# Patient Record
Sex: MALE | Race: WHITE | NOT HISPANIC OR LATINO
[De-identification: names, ages, dates, MRNs, and addresses within clinical notes are randomized per-mention and may not be internally consistent; named-entity substitution may affect disease eponyms.]

---

## 2018-07-20 PROBLEM — Z00.00 ENCOUNTER FOR PREVENTIVE HEALTH EXAMINATION: Status: ACTIVE | Noted: 2018-07-20

## 2018-07-27 ENCOUNTER — APPOINTMENT (OUTPATIENT)
Dept: PULMONOLOGY | Facility: CLINIC | Age: 78
End: 2018-07-27
Payer: MEDICARE

## 2018-07-27 VITALS
DIASTOLIC BLOOD PRESSURE: 70 MMHG | HEART RATE: 71 BPM | OXYGEN SATURATION: 95 % | TEMPERATURE: 97.8 F | SYSTOLIC BLOOD PRESSURE: 118 MMHG | BODY MASS INDEX: 17.33 KG/M2 | WEIGHT: 104 LBS | HEIGHT: 65 IN

## 2018-07-27 DIAGNOSIS — J18.9 PNEUMONIA, UNSPECIFIED ORGANISM: ICD-10-CM

## 2018-07-27 DIAGNOSIS — Z87.891 PERSONAL HISTORY OF NICOTINE DEPENDENCE: ICD-10-CM

## 2018-07-27 DIAGNOSIS — Z87.19 PERSONAL HISTORY OF OTHER DISEASES OF THE DIGESTIVE SYSTEM: ICD-10-CM

## 2018-07-27 PROCEDURE — 99204 OFFICE O/P NEW MOD 45 MIN: CPT | Mod: 25

## 2018-07-27 PROCEDURE — 94727 GAS DIL/WSHOT DETER LNG VOL: CPT

## 2018-07-27 PROCEDURE — 94729 DIFFUSING CAPACITY: CPT

## 2018-07-27 PROCEDURE — 94060 EVALUATION OF WHEEZING: CPT

## 2018-07-27 RX ORDER — FAMOTIDINE 20 MG/1
20 TABLET, FILM COATED ORAL
Qty: 30 | Refills: 0 | Status: ACTIVE | COMMUNITY
Start: 2018-07-05

## 2018-07-27 RX ORDER — METRONIDAZOLE 500 MG/1
500 TABLET ORAL
Qty: 15 | Refills: 0 | Status: COMPLETED | COMMUNITY
Start: 2018-07-05

## 2018-07-27 RX ORDER — PANCRELIPASE 60000; 12000; 38000 [USP'U]/1; [USP'U]/1; [USP'U]/1
12000-38000 CAPSULE, DELAYED RELEASE PELLETS ORAL
Qty: 90 | Refills: 0 | Status: ACTIVE | COMMUNITY
Start: 2018-07-05

## 2018-07-27 RX ORDER — ATORVASTATIN CALCIUM 20 MG/1
20 TABLET, FILM COATED ORAL
Qty: 30 | Refills: 0 | Status: ACTIVE | COMMUNITY
Start: 2018-03-27

## 2018-07-27 RX ORDER — RANITIDINE 150 MG/1
150 TABLET ORAL
Qty: 90 | Refills: 0 | Status: ACTIVE | COMMUNITY
Start: 2018-01-31

## 2018-07-27 RX ORDER — METOPROLOL SUCCINATE 50 MG/1
50 TABLET, EXTENDED RELEASE ORAL
Qty: 45 | Refills: 0 | Status: COMPLETED | COMMUNITY
Start: 2018-03-27

## 2018-07-27 RX ORDER — GENTAMICIN SULFATE 40 MG/ML
40 INJECTION, SOLUTION INTRAMUSCULAR; INTRAVENOUS
Qty: 2000 | Refills: 0 | Status: COMPLETED | COMMUNITY
Start: 2018-03-11

## 2018-07-27 RX ORDER — FINASTERIDE 5 MG/1
5 TABLET, FILM COATED ORAL
Qty: 30 | Refills: 0 | Status: ACTIVE | COMMUNITY
Start: 2018-07-05

## 2018-07-27 RX ORDER — ENALAPRIL MALEATE 5 MG/1
5 TABLET ORAL
Qty: 30 | Refills: 0 | Status: COMPLETED | COMMUNITY
Start: 2018-03-27

## 2018-07-27 RX ORDER — METOPROLOL SUCCINATE 25 MG/1
25 TABLET, EXTENDED RELEASE ORAL
Qty: 90 | Refills: 0 | Status: COMPLETED | COMMUNITY
Start: 2018-06-21

## 2018-07-27 RX ORDER — CEFPODOXIME PROXETIL 200 MG/1
200 TABLET, FILM COATED ORAL
Qty: 10 | Refills: 0 | Status: COMPLETED | COMMUNITY
Start: 2018-07-05

## 2018-07-27 RX ORDER — METOPROLOL TARTRATE 25 MG/1
25 TABLET, FILM COATED ORAL
Qty: 30 | Refills: 0 | Status: ACTIVE | COMMUNITY
Start: 2018-07-05

## 2020-12-08 ENCOUNTER — APPOINTMENT (OUTPATIENT)
Dept: NEUROLOGY | Facility: CLINIC | Age: 80
End: 2020-12-08
Payer: MEDICARE

## 2020-12-08 VITALS
BODY MASS INDEX: 18.66 KG/M2 | WEIGHT: 112 LBS | HEIGHT: 65 IN | DIASTOLIC BLOOD PRESSURE: 66 MMHG | HEART RATE: 60 BPM | SYSTOLIC BLOOD PRESSURE: 174 MMHG

## 2020-12-08 VITALS — TEMPERATURE: 97.1 F

## 2020-12-08 DIAGNOSIS — R53.82 CHRONIC FATIGUE, UNSPECIFIED: ICD-10-CM

## 2020-12-08 DIAGNOSIS — R06.02 SHORTNESS OF BREATH: ICD-10-CM

## 2020-12-08 DIAGNOSIS — H02.403 UNSPECIFIED PTOSIS OF BILATERAL EYELIDS: ICD-10-CM

## 2020-12-08 DIAGNOSIS — I10 ESSENTIAL (PRIMARY) HYPERTENSION: ICD-10-CM

## 2020-12-08 DIAGNOSIS — K25.7 CHRONIC GASTRIC ULCER W/OUT HEMORRHAGE OR PERFORATION: ICD-10-CM

## 2020-12-08 DIAGNOSIS — Z72.89 OTHER PROBLEMS RELATED TO LIFESTYLE: ICD-10-CM

## 2020-12-08 PROCEDURE — 99204 OFFICE O/P NEW MOD 45 MIN: CPT

## 2020-12-10 PROBLEM — K25.7: Status: RESOLVED | Noted: 2020-12-10 | Resolved: 2020-12-10

## 2020-12-10 PROBLEM — H02.403 PTOSIS OF BOTH EYELIDS: Status: ACTIVE | Noted: 2020-12-10

## 2020-12-10 PROBLEM — Z72.89 ALCOHOL USE: Status: ACTIVE | Noted: 2020-12-10

## 2020-12-10 PROBLEM — I10 BENIGN ESSENTIAL HYPERTENSION: Status: RESOLVED | Noted: 2020-12-10 | Resolved: 2020-12-10

## 2020-12-10 PROBLEM — R06.02 SOB (SHORTNESS OF BREATH) ON EXERTION: Status: ACTIVE | Noted: 2020-12-10

## 2020-12-10 PROBLEM — R53.82 CHRONIC FATIGUE: Status: ACTIVE | Noted: 2020-12-10

## 2020-12-10 NOTE — DATA REVIEWED
[de-identified] : Carotid duplex sonogram showed bilateral atherosclerotic changes which was not hemodynamically significant. 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  Neuromuscular junction disorder evaluation was not undertaken and EMG data was not provided [de-identified] : The patient had acetylcholine receptor binding antibodies which were all negative and Covid 19 antibody testing was also normal.  Blood chemistries are unremarkable including CBC and urine analysis with low vitamin D levels but there is no testing for muscle enzymes aldolase.

## 2020-12-10 NOTE — PHYSICAL EXAM
[FreeTextEntry1] : General examination is unremarkable.  There is obvious facial bilateral ptosis no particular temporalis atrophy and no significant frontal baldness but subdued facial expressions.  Optic disks are normal and visual fields are full with normal extraocular movements including supranuclear eye control.  There is bilateral facial weakness in both face no Myerson sign and has decreased hearing and uses hearing aids.  There is no atrophy fasciculation or abnormal movement of the tongue and gag reflexes moderately decreased but pharyngeal Jm's reflex is normal.  Muscles of mastication appear normal and neck muscles are 5/5.  His general motor strength is normal for his age without atrophy fasciculation or abnormal movements and is deep tendon reflexes are 1+ symmetric bilaterally and ankle reflexes absent.  There is no Babinski sign.  Detailed sensory evaluation is unremarkable and his gait is normal.\par \par Vital signs are stable and normal.  There is no carotid bruit, thyromegaly or lymphadenopathy.  There is no cataract.  Neck is supple with full range of motion and there are no meningeal signs.  Chest is clear at this time and there is no tenderness in the abdomen no organomegaly.  Pedal pulsations are normal and there is no leg edema.  Gait is unimpaired

## 2020-12-10 NOTE — DISCUSSION/SUMMARY
[FreeTextEntry1] : Opinion–patient has several years of progressive bilateral ptosis facial weakness dysarthria and dysphagia and perhaps some historic evidence of limb weakness with a strong family history mostly in the male members but confirmed that his mother had similar symptoms and all symptoms in his family members usually bring him after middle-age but no investigative procedures have ever been performed.\par \par The differential diagnosis is wide i including familial myasthenia, myotonic dystrophy, oculopharyngeal muscular dystrophy and rarely mitochondrial disorders.  I will perform a detailed electrophysiologic evaluation and had a personal conversation with Dr. Rodriguez who will take over the care of this patient being that he is a neuromuscular muscle disease expert and I will discuss this with the family after initial appropriate investigations prior to genetic testing by Dr. Rodriguez.  I had a long conversation with his daughter Phu and advised that he is at risk for aspiration pneumonia and choking and that is continue his care with his pulmonologist and preferably by an ENT surgeon.  Education and counseling was provided.

## 2020-12-10 NOTE — HISTORY OF PRESENT ILLNESS
[FreeTextEntry1] :  is 80 years old retired disabled male who accompanied his daughter Ngoc for neurological second opinion and she stated that he has been evaluated by stimulation physicians and neurologists who suggested that he may have myasthenia gravis and referred to my office.  The patient does not speak English and only speaks Cymro but her daughter is proficient in English and translated his symptoms.\par \par Current story the patient has history of bilateral lid ptosis for several years and had surgery 12 years ago without much improvement has facial muscle weakness for 12 years gets tired easily walking 2 blocks with cramps in the legs and has chronic swallowing problems for 4 years and had aspiration pneumonia treated by a pulmonologist in the hospital and he refused PEG.  He has history of stomach ulcer and upper GI problems and with swallowing any liquids he starts coughing and in the last 2 to 3 months he has developed shortness of breath and has occasional vertex headaches there is also mild generalized weakness but denied any diplopia there is mild dysarthria.  There is no history of tingling or numbness.\par \par Past medical history is pertinent for hypertension and is being treated with Toprol and amlodipine has no history of cardiac disease but history of aspiration pneumonia gastric ulcer.\par \par Family history is very strong for similar symptoms including his mother and siblings mother nephew but other female members are unaffected.  There is no family history of frontal baldness myotonic disease any documented neuromuscular diagnosis and most of them live in [place] and those in the [place] have not been investigated for neuromuscular disease including myasthenia gravis muscular dystrophy.\par \par I reviewed his medications and allergies and extensive medical records.

## 2020-12-10 NOTE — REVIEW OF SYSTEMS
[Feeling Poorly] : feeling poorly [Anxiety] : anxiety [Confused or Disoriented] : no confusion [Memory Lapses or Loss] : no memory loss [Decr. Concentrating Ability] : no decrease in concentrating ability [Difficulty with Language] : no ~M difficulty with language [Changed Thought Patterns] : no change in thought patterns [Repeating Questions] : no repeated questioning about recent events [Facial Weakness] : facial weakness [Arm Weakness] : arm weakness [Hand Weakness] :  hand weakness [Leg Weakness] : leg weakness [Poor Coordination] : good coordination [Difficulty Writing] : no difficulty writing [Difficulties in Speech] : speech difficulties [Numbness] : no numbness [Tingling] : no tingling [Abnormal Sensation] : no abnormal sensation [Hypersensitivity] : no hypersensitivity [Seizures] : no convulsions [Dizziness] : no dizziness [Fainting] : no fainting [Lightheadedness] : no lightheadedness [Cluster Headache] : no cluster headache [Migraine Headache] : no migraine headache [Tension Headache] : tension-type headaches [Difficulty Walking] : no difficulty walking [Inability to Walk] : able to walk [Ataxia] : no ataxia [Frequent Falls] : not falling [Limping] : not limping [As Noted in HPI] : as noted in HPI [Negative] : Heme/Lymph

## 2020-12-15 ENCOUNTER — APPOINTMENT (OUTPATIENT)
Dept: NEUROLOGY | Facility: CLINIC | Age: 80
End: 2020-12-15
Payer: MEDICARE

## 2020-12-15 VITALS — TEMPERATURE: 96.1 F

## 2020-12-15 DIAGNOSIS — R29.810 FACIAL WEAKNESS: ICD-10-CM

## 2020-12-15 PROCEDURE — 95910 NRV CNDJ TEST 7-8 STUDIES: CPT

## 2020-12-15 PROCEDURE — 95886 MUSC TEST DONE W/N TEST COMP: CPT

## 2020-12-15 PROCEDURE — 95885 MUSC TST DONE W/NERV TST LIM: CPT | Mod: 59

## 2021-01-07 ENCOUNTER — APPOINTMENT (OUTPATIENT)
Dept: NEUROLOGY | Facility: CLINIC | Age: 81
End: 2021-01-07
Payer: MEDICARE

## 2021-01-07 VITALS
HEIGHT: 65 IN | BODY MASS INDEX: 18.66 KG/M2 | HEART RATE: 63 BPM | SYSTOLIC BLOOD PRESSURE: 155 MMHG | WEIGHT: 112 LBS | DIASTOLIC BLOOD PRESSURE: 71 MMHG

## 2021-01-07 VITALS — TEMPERATURE: 97.3 F

## 2021-01-07 DIAGNOSIS — E78.00 PURE HYPERCHOLESTEROLEMIA, UNSPECIFIED: ICD-10-CM

## 2021-01-07 PROCEDURE — 99215 OFFICE O/P EST HI 40 MIN: CPT

## 2021-01-07 NOTE — HISTORY OF PRESENT ILLNESS
[FreeTextEntry1] : Patient presents for evaluation of ptosis and dysphagia.  Daughter gives history as patient only speaks Cameroonian.  They state that for at least 20 years he has had progressive ptosis and swallowing problems.  He was offered PEG 5 years ago and refused.  He had aspiration PNA 18 months ago but continues to not want a PEG.  He denies diplopia, dysarthria.  He states he has proximal UE and LE that could have started more than 10 years ago.  \par \par He states that his mother had droopy eyelids and his two brothers and nephews with droopy eyelids.  They also had some dysphagia. \par \par He more lately has developed SOB.  Recent

## 2021-01-07 NOTE — ASSESSMENT
[FreeTextEntry1] : This nice gentleman has a progressive syndrome of proximal weakness with ptosis and dysphagia with a strong family history - three boys from affected mother. \par \par Due to the mother being affected and lack of clinical myotonia on exam, DM is very unlikely.  This is almost certainly a presentation of OPMD. \par \par Will confirm with PABPN1 repeat analysis. \par \par

## 2021-01-07 NOTE — PHYSICAL EXAM
[Person] : oriented to person [Place] : oriented to place [Time] : oriented to time [Short Term Intact] : short term memory intact [Remote Intact] : remote memory intact [Registration Intact] : recent registration memory intact [Concentration Intact] : normal concentrating ability [Visual Intact] : visual attention was ~T not ~L decreased [Naming Objects] : no difficulty naming common objects [Repeating Phrases] : no difficulty repeating a phrase [Writing A Sentence] : no difficulty writing a sentence [Fluency] : fluency intact [Comprehension] : comprehension intact [Reading] : reading intact [Past History] : adequate knowledge of personal past history [Cranial Nerves Optic (II)] : visual acuity intact bilaterally,  visual fields full to confrontation, pupils equal round and reactive to light [Cranial Nerves Oculomotor (III)] : extraocular motion intact [Cranial Nerves Trigeminal (V)] : facial sensation intact symmetrically [Cranial Nerves Facial (VII)] : face symmetrical [Cranial Nerves Vestibulocochlear (VIII)] : hearing was intact bilaterally [Cranial Nerves Glossopharyngeal (IX)] : tongue and palate midline [Cranial Nerves Accessory (XI - Cranial And Spinal)] : head turning and shoulder shrug symmetric [Cranial Nerves Hypoglossal (XII)] : there was no tongue deviation with protrusion [Motor Tone] : muscle tone was normal in all four extremities [No Muscle Atrophy] : normal bulk in all four extremities [Motor Handedness Right-Handed] : the patient is right hand dominant [Hand Weakness Right] : normal hand  [Hand Weakness Left] : normal hand  [+4] : knee flexion +4/5 [5] : ankle plantar flexion 5/5 [Sensation Tactile Decrease] : light touch was intact [Abnormal Walk] : normal gait [Balance] : balance was intact [Past-pointing] : there was no past-pointing [Tremor] : no tremor present [1+] : Patella left 1+ [0] : Ankle jerk left 0 [Plantar Reflex Right Only] : normal on the right [Plantar Reflex Left Only] : normal on the left [FreeTextEntry5] : bilateral ptosis [FreeTextEntry6] : no percussion myotonia

## 2021-01-21 ENCOUNTER — TRANSCRIPTION ENCOUNTER (OUTPATIENT)
Age: 81
End: 2021-01-21

## 2021-03-18 ENCOUNTER — LABORATORY RESULT (OUTPATIENT)
Age: 81
End: 2021-03-18

## 2021-10-28 ENCOUNTER — APPOINTMENT (OUTPATIENT)
Dept: NEUROLOGY | Facility: CLINIC | Age: 81
End: 2021-10-28
Payer: MEDICARE

## 2021-10-28 VITALS
WEIGHT: 110 LBS | SYSTOLIC BLOOD PRESSURE: 134 MMHG | BODY MASS INDEX: 18.33 KG/M2 | HEART RATE: 63 BPM | HEIGHT: 65 IN | DIASTOLIC BLOOD PRESSURE: 70 MMHG

## 2021-10-28 DIAGNOSIS — G71.09 OTHER SPECIFIED MUSCULAR DYSTROPHIES: ICD-10-CM

## 2021-10-28 PROCEDURE — 99214 OFFICE O/P EST MOD 30 MIN: CPT

## 2021-10-29 NOTE — ASSESSMENT
[FreeTextEntry1] : Patient has genetically confirmed OPMD.\par \par I have counseled his daughter as to the AD inheritance pattern and that if she is concerned about her sons she can get tested and if negative she doesn't have to worry about them getting it. \par \par

## 2021-10-29 NOTE — HISTORY OF PRESENT ILLNESS
[FreeTextEntry1] : Patient presents for genetic test results.  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  He understands that he is high risk for PNA but refuses PEG.

## 2021-10-29 NOTE — PHYSICAL EXAM
[Person] : oriented to person [Place] : oriented to place [Time] : oriented to time [Short Term Intact] : short term memory intact [Remote Intact] : remote memory intact [Registration Intact] : recent registration memory intact [Concentration Intact] : normal concentrating ability [Visual Intact] : visual attention was ~T not ~L decreased [Naming Objects] : no difficulty naming common objects [Repeating Phrases] : no difficulty repeating a phrase [Writing A Sentence] : no difficulty writing a sentence [Fluency] : fluency intact [Comprehension] : comprehension intact [Reading] : reading intact [Past History] : adequate knowledge of personal past history [Cranial Nerves Optic (II)] : visual acuity intact bilaterally,  visual fields full to confrontation, pupils equal round and reactive to light [Cranial Nerves Oculomotor (III)] : extraocular motion intact [Cranial Nerves Trigeminal (V)] : facial sensation intact symmetrically [Cranial Nerves Facial (VII)] : face symmetrical [Cranial Nerves Vestibulocochlear (VIII)] : hearing was intact bilaterally [Cranial Nerves Glossopharyngeal (IX)] : tongue and palate midline [Cranial Nerves Accessory (XI - Cranial And Spinal)] : head turning and shoulder shrug symmetric [Cranial Nerves Hypoglossal (XII)] : there was no tongue deviation with protrusion [Motor Tone] : muscle tone was normal in all four extremities [No Muscle Atrophy] : normal bulk in all four extremities [Motor Handedness Right-Handed] : the patient is right hand dominant [+4] : knee flexion +4/5 [5] : ankle plantar flexion 5/5 [Sensation Tactile Decrease] : light touch was intact [Abnormal Walk] : normal gait [Balance] : balance was intact [1+] : Patella left 1+ [0] : Ankle jerk left 0 [Hand Weakness Right] : normal hand  [Hand Weakness Left] : normal hand  [Past-pointing] : there was no past-pointing [Tremor] : no tremor present [Plantar Reflex Right Only] : normal on the right [Plantar Reflex Left Only] : normal on the left [FreeTextEntry5] : bilateral ptosis [FreeTextEntry6] : no percussion myotonia

## 2022-01-19 ENCOUNTER — APPOINTMENT (OUTPATIENT)
Dept: NEUROLOGY | Facility: CLINIC | Age: 82
End: 2022-01-19

## 2023-05-05 ENCOUNTER — NON-APPOINTMENT (OUTPATIENT)
Age: 83
End: 2023-05-05

## 2023-05-05 ENCOUNTER — APPOINTMENT (OUTPATIENT)
Dept: THORACIC SURGERY | Facility: CLINIC | Age: 83
End: 2023-05-05
Payer: MEDICARE

## 2023-05-05 VITALS
HEART RATE: 74 BPM | BODY MASS INDEX: 17.63 KG/M2 | SYSTOLIC BLOOD PRESSURE: 157 MMHG | WEIGHT: 105.82 LBS | DIASTOLIC BLOOD PRESSURE: 73 MMHG | OXYGEN SATURATION: 94 % | HEIGHT: 65 IN

## 2023-05-05 DIAGNOSIS — R05.3 CHRONIC COUGH: ICD-10-CM

## 2023-05-05 PROCEDURE — 99205 OFFICE O/P NEW HI 60 MIN: CPT

## 2023-05-05 NOTE — PHYSICAL EXAM
[Fully active, able to carry on all pre-disease performance without restriction] : Status 0 - Fully active, able to carry on all pre-disease performance without restriction [General Appearance - Alert] : alert [General Appearance - In No Acute Distress] : in no acute distress [Sclera] : the sclera and conjunctiva were normal [PERRL With Normal Accommodation] : pupils were equal in size, round, and reactive to light [Extraocular Movements] : extraocular movements were intact [Outer Ear] : the ears and nose were normal in appearance [Oropharynx] : the oropharynx was normal [Neck Appearance] : the appearance of the neck was normal [Neck Cervical Mass (___cm)] : no neck mass was observed [Jugular Venous Distention Increased] : there was no jugular-venous distention [Thyroid Diffuse Enlargement] : the thyroid was not enlarged [Thyroid Nodule] : there were no palpable thyroid nodules [Normal Rate] : the respiratory rate was normal [Normal Rhythm/Effort] : normal respiratory rhythm and effort [Decreased Breath Sounds Bilaterally] : breath sounds were diminished over both lungs [Heart Rate And Rhythm] : heart rate was normal and rhythm regular [Heart Sounds] : normal S1 and S2 [Heart Sounds Gallop] : no gallops [Murmurs] : no murmurs [Heart Sounds Pericardial Friction Rub] : no pericardial rub [Examination Of The Chest] : the chest was normal in appearance [Chest Visual Inspection Thoracic Asymmetry] : no chest asymmetry [Diminished Respiratory Excursion] : normal chest expansion [2+] : left 2+ [No Abnormalities] : the abdominal aorta was not enlarged and no bruit was heard [Breast Appearance] : normal in appearance [Breast Palpation Mass] : no palpable masses [Bowel Sounds] : normal bowel sounds [Abdomen Soft] : soft [Abdomen Tenderness] : non-tender [Abdomen Mass (___ Cm)] : no abdominal mass palpated [Cervical Lymph Nodes Enlarged Posterior Bilaterally] : posterior cervical [Cervical Lymph Nodes Enlarged Anterior Bilaterally] : anterior cervical [Supraclavicular Lymph Nodes Enlarged Bilaterally] : supraclavicular [No CVA Tenderness] : no ~M costovertebral angle tenderness [No Spinal Tenderness] : no spinal tenderness [Abnormal Walk] : normal gait [Nail Clubbing] : no clubbing  or cyanosis of the fingernails [Musculoskeletal - Swelling] : no joint swelling seen [Motor Tone] : muscle strength and tone were normal [Skin Color & Pigmentation] : normal skin color and pigmentation [Skin Turgor] : normal skin turgor [] : no rash [Deep Tendon Reflexes (DTR)] : deep tendon reflexes were 2+ and symmetric [Sensation] : the sensory exam was normal to light touch and pinprick [No Focal Deficits] : no focal deficits [Oriented To Time, Place, And Person] : oriented to person, place, and time [Impaired Insight] : insight and judgment were intact [Affect] : the affect was normal [Right Carotid Bruit] : no bruit heard over the right carotid [Left Carotid Bruit] : no bruit heard over the left carotid [Right Femoral Bruit] : no bruit heard over the right femoral artery [Left Femoral Bruit] : no bruit heard over the left femoral artery [FreeTextEntry1] : Deferred

## 2023-05-05 NOTE — ASSESSMENT
[FreeTextEntry1] : Mr. YENI SEGURA, 83 year old male, former smoker, w/ hx of TB exposure, COPD, GERD, COVID (12/2021 s/p monoclonal antibodies infusion), HTN, HLD, depression, gastric ulcer s/p partial gastrectomy, multiple recurrent aspiration requiring hospitalization, was recommended for PEG placement however, patient refused. Referred by Dr. Rodriguez for RUL nodule. \par \par CT chest on 06/03/2022:\par - Part solid nodule with mild increase in density since prior, with soft tissue component, overall size 11 mm in right upper lobe, image 113 series 5, with solid component measuring approximately 3 mm.\par - Some stable small nodules such as a stable 9 mm nodule in the right lower lobe superior segment, image 160 series 5.\par - Focal areas of bronchial wall thickening particularly in the right upper lobe posteriorly. A new small nodule, 2 to 3 mm in the right upper lobe, image 63 series 5. New branching opacity in the lingula, image 191 series 5 probably mucoid impaction and adjacent clustered nodules, as on image 172 series 5. New clustered nodules also present in the inferior posterior right upper lobe, image 171 series 5, right middle lobe inferiorly, image 309 series 5; left lower lobe, image 262 series 5, and other locations.\par \par CT Chest on 3/23/23: \par - Moderate upper lobe predominant emphysema\par - Moderate biapical pleural-parenchymal scarring; Scattered calcified granulomas.\par - Scattered solid and nonsolid nodules, annotations on series 5:\par * Stable 1 cm part solid nodule in RUL with stable 3 mm solid component (series 5 image 91) increased in size and     density from more remote prior studies.\par * Stable 9 mm solid nodule in RLL (series 5 image 136) not significantly changed since at least 2015.\par * Stable 7 mm RUL solid nodule (series 5 image 130)\par * Scattered additional small solid nodules measuring up to 4 mm in size are stable and annotated on series 5.\par - Clustered tree-in-bud nodularity in the RML, slightly increased in extent from prior (series 5 image 241-266) \par - Clustered tree-in-bud nodularity previously seen in the MADI has decreased now with minimal residual endobronchial mucoid impaction (series 5 image 158) \par - Slightly decreased clustered tree-in-bud nodularity in the RUL (series 5 image 151)\par - Few subcentimeter calcified mediastinal and right hilar lymph nodes, likely sequela of prior granulomatous disease.\par - Punctate hepatic calcified granulomas.\par \par PFTs on 04/24/2023: FVC 2.32 (75%), FEV1 2.06 (97%), DLCO 158%. \par \par I have reviewed the patient's medical records and diagnostic images at time of this office consultation and have made the following recommendation:\par 1. CT chest reviewed and explained to patient, stable RUL nodule with stable 3 mm solid component, will continue continue monitor, RTC in 6 months with CT chest w/o contrast. \par 2. Modified Barium esophagram as scheduled. Will refer to speech swallow if needed. \par \par I, NEVILLE Miner, personally performed the evaluation and management (E/M) services for this new patient.  That E/M includes conducting the initial examination, assessing all conditions, and establishing the plan of care.  Today, my ACP, [place] Lauren, ANP-C, was here to observe my evaluation and management services for this patient to be followed going forward.

## 2023-05-05 NOTE — HISTORY OF PRESENT ILLNESS
[FreeTextEntry1] : Mr. YENI SEGURA, 83 year old male, former smoker, w/ hx of TB exposure, COPD, GERD, COVID (12/2021 s/p monoclonal antibodies infusion), HTN, HLD, depression, gastric ulcer s/p partial gastrectomy, multiple recurrent aspiration requiring hospitalization, was recommended for PEG placement however, patient refused. Referred by Dr. Rodriguez for RUL nodule. \par \par CT chest on 06/03/2022:\par - Part solid nodule with mild increase in density since prior, with soft tissue component, overall size 11 mm in right upper lobe, image 113 series 5, with solid component measuring approximately 3 mm.\par - Some stable small nodules such as a stable 9 mm nodule in the right lower lobe superior segment, image 160 series 5.\par - Focal areas of bronchial wall thickening particularly in the right upper lobe posteriorly. A new small nodule, 2 to 3 mm in the right upper lobe, image 63 series 5. New branching opacity in the lingula, image 191 series 5 probably mucoid impaction and adjacent clustered nodules, as on image 172 series 5. New clustered nodules also present in the inferior posterior right upper lobe, image 171 series 5, right middle lobe inferiorly, image 309 series 5; left lower lobe, image 262 series 5, and other locations.\par \par CT Chest on 3/23/23: \par - Moderate upper lobe predominant emphysema\par - Moderate biapical pleural-parenchymal scarring; Scattered calcified granulomas.\par - Scattered solid and nonsolid nodules, annotations on series 5:\par * Stable 1 cm part solid nodule in RUL with stable 3 mm solid component (series 5 image 91) increased in size and     density from more remote prior studies.\par * Stable 9 mm solid nodule in RLL (series 5 image 136) not significantly changed since at least 2015.\par * Stable 7 mm RUL solid nodule (series 5 image 130)\par * Scattered additional small solid nodules measuring up to 4 mm in size are stable and annotated on series 5.\par - Clustered tree-in-bud nodularity in the RML, slightly increased in extent from prior (series 5 image 241-266) \par - Clustered tree-in-bud nodularity previously seen in the MADI has decreased now with minimal residual endobronchial mucoid impaction (series 5 image 158) \par - Slightly decreased clustered tree-in-bud nodularity in the RUL (series 5 image 151)\par - Few subcentimeter calcified mediastinal and right hilar lymph nodes, likely sequela of prior granulomatous disease.\par - Punctate hepatic calcified granulomas.\par \par PFTs on 04/24/2023: FVC 2.32 (75%), FEV1 2.06 (97%), DLCO 158%. \par \par Patient is here today for CT surgery consultation. Patient denies shortness of breath, cough, chest pain, fever, chills, loss of appetite, weight loss, or hemoptysis. C/o coughing while eating. Patient is scheduled for Modified Barium esophagram on 05/12/2023.

## 2023-11-28 ENCOUNTER — APPOINTMENT (OUTPATIENT)
Dept: THORACIC SURGERY | Facility: CLINIC | Age: 83
End: 2023-11-28
Payer: MEDICARE

## 2023-11-28 VITALS
OXYGEN SATURATION: 95 % | HEART RATE: 65 BPM | RESPIRATION RATE: 16 BRPM | HEIGHT: 65 IN | BODY MASS INDEX: 17.49 KG/M2 | SYSTOLIC BLOOD PRESSURE: 147 MMHG | WEIGHT: 105 LBS | DIASTOLIC BLOOD PRESSURE: 68 MMHG

## 2023-11-28 PROCEDURE — 99214 OFFICE O/P EST MOD 30 MIN: CPT

## 2024-02-27 ENCOUNTER — APPOINTMENT (OUTPATIENT)
Dept: THORACIC SURGERY | Facility: CLINIC | Age: 84
End: 2024-02-27
Payer: MEDICARE

## 2024-02-27 VITALS
WEIGHT: 99.21 LBS | DIASTOLIC BLOOD PRESSURE: 78 MMHG | RESPIRATION RATE: 17 BRPM | HEART RATE: 73 BPM | HEIGHT: 65 IN | OXYGEN SATURATION: 96 % | BODY MASS INDEX: 16.53 KG/M2 | SYSTOLIC BLOOD PRESSURE: 144 MMHG

## 2024-02-27 DIAGNOSIS — R13.13 DYSPHAGIA, PHARYNGEAL PHASE: ICD-10-CM

## 2024-02-27 DIAGNOSIS — R91.8 OTHER NONSPECIFIC ABNORMAL FINDING OF LUNG FIELD: ICD-10-CM

## 2024-02-27 PROCEDURE — 99214 OFFICE O/P EST MOD 30 MIN: CPT

## 2024-02-27 NOTE — PHYSICAL EXAM
[] : no respiratory distress [Auscultation Breath Sounds / Voice Sounds] : lungs were clear to auscultation bilaterally [Heart Rate And Rhythm] : heart rate was normal and rhythm regular [Heart Sounds] : normal S1 and S2 [Murmurs] : no murmurs [Heart Sounds Gallop] : no gallops [Heart Sounds Pericardial Friction Rub] : no pericardial rub [Chest Visual Inspection Thoracic Asymmetry] : no chest asymmetry [Examination Of The Chest] : the chest was normal in appearance [Diminished Respiratory Excursion] : normal chest expansion

## 2024-02-27 NOTE — HISTORY OF PRESENT ILLNESS
[FreeTextEntry1] : Mr. YENI SEGURA, 84 year old male, former smoker, w/ hx of TB exposure, COPD, GERD, COVID (12/2021 s/p monoclonal antibodies infusion), HTN, HLD, depression, gastric ulcer s/p partial gastrectomy, multiple recurrent aspiration requiring hospitalization, was recommended for PEG placement however, patient refused. Referred by Dr. Rodriguez for RUL nodule.  CT chest on 06/03/2022: - Part solid nodule with mild increase in density since prior, with soft tissue component, overall size 11 mm in right upper lobe, image 113 series 5, with solid component measuring approximately 3 mm. - Some stable small nodules such as a stable 9 mm nodule in the right lower lobe superior segment, image 160 series 5. - Focal areas of bronchial wall thickening particularly in the right upper lobe posteriorly. A new small nodule, 2 to 3 mm in the right upper lobe, image 63 series 5. New branching opacity in the lingula, image 191 series 5 probably mucoid impaction and adjacent clustered nodules, as on image 172 series 5. New clustered nodules also present in the inferior posterior right upper lobe, image 171 series 5, right middle lobe inferiorly, image 309 series 5; left lower lobe, image 262 series 5, and other locations.  CT Chest on 3/23/23: - Moderate upper lobe predominant emphysema - Moderate biapical pleural-parenchymal scarring; Scattered calcified granulomas. - Scattered solid and nonsolid nodules, annotations on series 5: * Stable 1 cm part solid nodule in RUL with stable 3 mm solid component (series 5 image 91) increased in size and density from more remote prior studies. * Stable 9 mm solid nodule in RLL (series 5 image 136) not significantly changed since at least 2015. * Stable 7 mm RUL solid nodule (series 5 image 130) * Scattered additional small solid nodules measuring up to 4 mm in size are stable and annotated on series 5. - Clustered tree-in-bud nodularity in the RML, slightly increased in extent from prior (series 5 image 241-266) - Clustered tree-in-bud nodularity previously seen in the MADI has decreased now with minimal residual endobronchial mucoid impaction (series 5 image 158) - Slightly decreased clustered tree-in-bud nodularity in the RUL (series 5 image 151) - Few subcentimeter calcified mediastinal and right hilar lymph nodes, likely sequela of prior granulomatous disease. - Punctate hepatic calcified granulomas.  PFTs on 04/24/2023: FVC 2.32 (75%), FEV1 2.06 (97%), DLCO 158%.  CT Chest on 10/17/23 at [facility]: - moderate upper lobe predominant emphysema - a new 1cm RLL ggo (5: 233) w/ adjacent small clustered nodules (5: 246) - scattered solid and nonsolid nodules: stable 1cm part solid RUL w/ a stable 3mm solid component (5: 103) but increased in size and density from more remote prior studies; stable 8mm solid RLL (5: 146); stable 7mm solid RUL (5: 141) - scattered additional solid nodules measuring up to 4mm  Seen in November, 2023: Admits to chronic, severe dysphagia, followed by GI, and refused PEG placement.   CT Chest on 2/14/2024: - RUL part solid nodule again measures 10mm w/ 3 mm solid component (Image 127) - RUL subpleural 4 mm nodule, stable (Image 122) - RLL solid nodule, 8mm (Image 171) - Areas of superimposed tree-in-bud nodularity/groundglass are stable/slightly improved with persistent areas of mucoid impaction and bronchiectasis specifically in the RML.  - Punctate nonobstructive left renal calculus measuring 2mm  Patient presents today or follow up. Reports severe dysphagia, follows GI but continues to refuse PEG placement. Reports shortness of breath with talking and activty, (+) dry cough

## 2024-02-27 NOTE — ASSESSMENT
[FreeTextEntry1] : Mr. YENI SEGURA, 84 year old male, former smoker, w/ hx of TB exposure, COPD, GERD, COVID (12/2021 s/p monoclonal antibodies infusion), HTN, HLD, depression, gastric ulcer s/p partial gastrectomy, multiple recurrent aspiration requiring hospitalization, was recommended for PEG placement however, patient refused. Referred by Dr. Rodriguez for RUL nodule.  He presents today for follow up with imaging.   I have reviewed the patient's medical records and diagnostic images at time of this office consultation and have made the following recommendation: 1. CT Chest reviewed with patient and his daughter, stable lung findings. I discussed repeating CT Chest without contrast in 6 months for re-evaluation. 2. Additionally, patient has severe dysphagia, discussed feeding tube placement for nutritional needs. He will think about his options and contact office with decision. If he decides to proceed, will schedule for redo laparotomy, feeding jejunostomy.  Recommendations reviewed with patient during this office visit, and all questions answered; Patient instructed on the importance of follow up and verbalizes understanding.   I, NEVILLE Miner, personally performed the evaluation and management (E/M) services for this established patient. That E/M includes conducting the examination, assessing all new/exacerbated conditions, and establishing a new plan of care. Today, my ACP, Favian Cisse NP. was here to observe my evaluation and management services for this new problem/exacerbated condition to be followed going forward.

## 2024-05-29 ENCOUNTER — EMERGENCY (EMERGENCY)
Facility: HOSPITAL | Age: 84
LOS: 1 days | Discharge: HOME | End: 2024-05-29
Attending: EMERGENCY MEDICINE
Payer: MEDICARE

## 2024-05-29 VITALS
DIASTOLIC BLOOD PRESSURE: 56 MMHG | SYSTOLIC BLOOD PRESSURE: 123 MMHG | OXYGEN SATURATION: 99 % | RESPIRATION RATE: 18 BRPM | HEART RATE: 60 BPM

## 2024-05-29 VITALS
TEMPERATURE: 97 F | HEART RATE: 53 BPM | RESPIRATION RATE: 18 BRPM | WEIGHT: 105.82 LBS | DIASTOLIC BLOOD PRESSURE: 67 MMHG | OXYGEN SATURATION: 98 % | SYSTOLIC BLOOD PRESSURE: 107 MMHG

## 2024-05-29 LAB
ALBUMIN SERPL ELPH-MCNC: 3.1 G/DL — LOW (ref 3.5–5)
ALP SERPL-CCNC: 74 U/L — SIGNIFICANT CHANGE UP (ref 40–120)
ALT FLD-CCNC: 48 U/L DA — SIGNIFICANT CHANGE UP (ref 10–60)
ANION GAP SERPL CALC-SCNC: 4 MMOL/L — LOW (ref 5–17)
APPEARANCE UR: CLEAR — SIGNIFICANT CHANGE UP
APTT BLD: 28.4 SEC — SIGNIFICANT CHANGE UP (ref 24.5–35.6)
AST SERPL-CCNC: 66 U/L — HIGH (ref 10–40)
BASOPHILS # BLD AUTO: 0.03 K/UL — SIGNIFICANT CHANGE UP (ref 0–0.2)
BASOPHILS NFR BLD AUTO: 0.2 % — SIGNIFICANT CHANGE UP (ref 0–2)
BILIRUB SERPL-MCNC: 0.3 MG/DL — SIGNIFICANT CHANGE UP (ref 0.2–1.2)
BILIRUB UR-MCNC: NEGATIVE — SIGNIFICANT CHANGE UP
BUN SERPL-MCNC: 24 MG/DL — HIGH (ref 7–18)
CALCIUM SERPL-MCNC: 8.3 MG/DL — LOW (ref 8.4–10.5)
CHLORIDE SERPL-SCNC: 112 MMOL/L — HIGH (ref 96–108)
CO2 SERPL-SCNC: 26 MMOL/L — SIGNIFICANT CHANGE UP (ref 22–31)
COLOR SPEC: YELLOW — SIGNIFICANT CHANGE UP
CREAT SERPL-MCNC: 0.71 MG/DL — SIGNIFICANT CHANGE UP (ref 0.5–1.3)
DIFF PNL FLD: NEGATIVE — SIGNIFICANT CHANGE UP
EGFR: 90 ML/MIN/1.73M2 — SIGNIFICANT CHANGE UP
EOSINOPHIL # BLD AUTO: 0.01 K/UL — SIGNIFICANT CHANGE UP (ref 0–0.5)
EOSINOPHIL NFR BLD AUTO: 0.1 % — SIGNIFICANT CHANGE UP (ref 0–6)
GLUCOSE SERPL-MCNC: 103 MG/DL — HIGH (ref 70–99)
GLUCOSE UR QL: NEGATIVE MG/DL — SIGNIFICANT CHANGE UP
HCT VFR BLD CALC: 36.8 % — LOW (ref 39–50)
HGB BLD-MCNC: 11.3 G/DL — LOW (ref 13–17)
IMM GRANULOCYTES NFR BLD AUTO: 0.4 % — SIGNIFICANT CHANGE UP (ref 0–0.9)
INR BLD: 0.95 RATIO — SIGNIFICANT CHANGE UP (ref 0.85–1.18)
KETONES UR-MCNC: 15 MG/DL
LACTATE SERPL-SCNC: 1.4 MMOL/L — SIGNIFICANT CHANGE UP (ref 0.7–2)
LEUKOCYTE ESTERASE UR-ACNC: NEGATIVE — SIGNIFICANT CHANGE UP
LYMPHOCYTES # BLD AUTO: 0.96 K/UL — LOW (ref 1–3.3)
LYMPHOCYTES # BLD AUTO: 5.6 % — LOW (ref 13–44)
MCHC RBC-ENTMCNC: 29.4 PG — SIGNIFICANT CHANGE UP (ref 27–34)
MCHC RBC-ENTMCNC: 30.7 GM/DL — LOW (ref 32–36)
MCV RBC AUTO: 95.8 FL — SIGNIFICANT CHANGE UP (ref 80–100)
MONOCYTES # BLD AUTO: 0.64 K/UL — SIGNIFICANT CHANGE UP (ref 0–0.9)
MONOCYTES NFR BLD AUTO: 3.7 % — SIGNIFICANT CHANGE UP (ref 2–14)
NEUTROPHILS # BLD AUTO: 15.39 K/UL — HIGH (ref 1.8–7.4)
NEUTROPHILS NFR BLD AUTO: 90 % — HIGH (ref 43–77)
NITRITE UR-MCNC: NEGATIVE — SIGNIFICANT CHANGE UP
NRBC # BLD: 0 /100 WBCS — SIGNIFICANT CHANGE UP (ref 0–0)
NT-PROBNP SERPL-SCNC: 93 PG/ML — SIGNIFICANT CHANGE UP (ref 0–450)
PH UR: 5 — SIGNIFICANT CHANGE UP (ref 5–8)
PLATELET # BLD AUTO: 284 K/UL — SIGNIFICANT CHANGE UP (ref 150–400)
POTASSIUM SERPL-MCNC: 4.7 MMOL/L — SIGNIFICANT CHANGE UP (ref 3.5–5.3)
POTASSIUM SERPL-SCNC: 4.7 MMOL/L — SIGNIFICANT CHANGE UP (ref 3.5–5.3)
PROT SERPL-MCNC: 6.8 G/DL — SIGNIFICANT CHANGE UP (ref 6–8.3)
PROT UR-MCNC: NEGATIVE MG/DL — SIGNIFICANT CHANGE UP
PROTHROM AB SERPL-ACNC: 10.9 SEC — SIGNIFICANT CHANGE UP (ref 9.5–13)
RBC # BLD: 3.84 M/UL — LOW (ref 4.2–5.8)
RBC # FLD: 13.8 % — SIGNIFICANT CHANGE UP (ref 10.3–14.5)
SODIUM SERPL-SCNC: 142 MMOL/L — SIGNIFICANT CHANGE UP (ref 135–145)
SP GR SPEC: 1.02 — SIGNIFICANT CHANGE UP (ref 1–1.03)
TROPONIN I, HIGH SENSITIVITY RESULT: 5.3 NG/L — SIGNIFICANT CHANGE UP
TROPONIN I, HIGH SENSITIVITY RESULT: 5.9 NG/L — SIGNIFICANT CHANGE UP
TSH SERPL-MCNC: 1.48 UU/ML — SIGNIFICANT CHANGE UP (ref 0.34–4.82)
UROBILINOGEN FLD QL: 1 MG/DL — SIGNIFICANT CHANGE UP (ref 0.2–1)
WBC # BLD: 17.1 K/UL — HIGH (ref 3.8–10.5)
WBC # FLD AUTO: 17.1 K/UL — HIGH (ref 3.8–10.5)

## 2024-05-29 PROCEDURE — 85025 COMPLETE CBC W/AUTO DIFF WBC: CPT

## 2024-05-29 PROCEDURE — 99285 EMERGENCY DEPT VISIT HI MDM: CPT | Mod: 25

## 2024-05-29 PROCEDURE — 71045 X-RAY EXAM CHEST 1 VIEW: CPT

## 2024-05-29 PROCEDURE — 83880 ASSAY OF NATRIURETIC PEPTIDE: CPT

## 2024-05-29 PROCEDURE — 99285 EMERGENCY DEPT VISIT HI MDM: CPT

## 2024-05-29 PROCEDURE — 96365 THER/PROPH/DIAG IV INF INIT: CPT

## 2024-05-29 PROCEDURE — 93005 ELECTROCARDIOGRAM TRACING: CPT

## 2024-05-29 PROCEDURE — 96368 THER/DIAG CONCURRENT INF: CPT

## 2024-05-29 PROCEDURE — 87040 BLOOD CULTURE FOR BACTERIA: CPT

## 2024-05-29 PROCEDURE — 84443 ASSAY THYROID STIM HORMONE: CPT

## 2024-05-29 PROCEDURE — 85610 PROTHROMBIN TIME: CPT

## 2024-05-29 PROCEDURE — 84484 ASSAY OF TROPONIN QUANT: CPT

## 2024-05-29 PROCEDURE — 80053 COMPREHEN METABOLIC PANEL: CPT

## 2024-05-29 PROCEDURE — 85730 THROMBOPLASTIN TIME PARTIAL: CPT

## 2024-05-29 PROCEDURE — 71045 X-RAY EXAM CHEST 1 VIEW: CPT | Mod: 26

## 2024-05-29 PROCEDURE — 36415 COLL VENOUS BLD VENIPUNCTURE: CPT

## 2024-05-29 PROCEDURE — 83605 ASSAY OF LACTIC ACID: CPT

## 2024-05-29 PROCEDURE — 81003 URINALYSIS AUTO W/O SCOPE: CPT

## 2024-05-29 RX ORDER — CEFTRIAXONE 500 MG/1
1000 INJECTION, POWDER, FOR SOLUTION INTRAMUSCULAR; INTRAVENOUS ONCE
Refills: 0 | Status: COMPLETED | OUTPATIENT
Start: 2024-05-29 | End: 2024-05-29

## 2024-05-29 RX ORDER — AZITHROMYCIN 500 MG/1
6 TABLET, FILM COATED ORAL
Qty: 24 | Refills: 0
Start: 2024-05-29 | End: 2024-06-01

## 2024-05-29 RX ORDER — SODIUM CHLORIDE 9 MG/ML
1500 INJECTION INTRAMUSCULAR; INTRAVENOUS; SUBCUTANEOUS ONCE
Refills: 0 | Status: COMPLETED | OUTPATIENT
Start: 2024-05-29 | End: 2024-05-29

## 2024-05-29 RX ORDER — AZITHROMYCIN 500 MG/1
500 TABLET, FILM COATED ORAL ONCE
Refills: 0 | Status: COMPLETED | OUTPATIENT
Start: 2024-05-29 | End: 2024-05-29

## 2024-05-29 RX ADMIN — CEFTRIAXONE 1000 MILLIGRAM(S): 500 INJECTION, POWDER, FOR SOLUTION INTRAMUSCULAR; INTRAVENOUS at 18:32

## 2024-05-29 RX ADMIN — AZITHROMYCIN 255 MILLIGRAM(S): 500 TABLET, FILM COATED ORAL at 17:14

## 2024-05-29 RX ADMIN — SODIUM CHLORIDE 1500 MILLILITER(S): 9 INJECTION INTRAMUSCULAR; INTRAVENOUS; SUBCUTANEOUS at 11:50

## 2024-05-29 RX ADMIN — SODIUM CHLORIDE 1500 MILLILITER(S): 9 INJECTION INTRAMUSCULAR; INTRAVENOUS; SUBCUTANEOUS at 12:27

## 2024-05-29 RX ADMIN — CEFTRIAXONE 100 MILLIGRAM(S): 500 INJECTION, POWDER, FOR SOLUTION INTRAMUSCULAR; INTRAVENOUS at 17:13

## 2024-05-29 RX ADMIN — AZITHROMYCIN 500 MILLIGRAM(S): 500 TABLET, FILM COATED ORAL at 18:32

## 2024-05-29 NOTE — ED ADULT TRIAGE NOTE - HISTORY OF COVID-19 VACCINATION
[FreeTextEntry1] : Ms. DUSTIN NEIL, 82 year old female,former smoker (1 PPD x 30 years; Quit 1990), w/ hx of COPD, HLD Osteoporosis (Last Prolia injection March, 2022), Dilated thoracic aorta and pulmonary nodules. Found to have lobulated nodule in the lingula. \par \par Now, s/p Bronch, Left VATS, lingulectomy with  hilar and mediastinal lymph node sampling on 8/23/22. Path Adenocarcinoma, acinar predominant, with complex glands (cribriform and fused glands) and focal micropapillary pattern. Grade 3 (>20% complex or micropapillary); 1.3 cm; All margins and LN (0/19) negative; pT1b pN0\par \par CT scan showed no evidence of recurrence, recommended patient to return to office in 6mo w/ CT Chest w/o contrast. Overall doing well from surgical perspective. \par \par \par I, TENZIN Carreon, personally performed the evaluation and management (E/M) services for this established patient who presents today with (a) new problem(s)/exacerbation of (an) existing condition(s). That E/M includes conducting the examination, assessing all new/exacerbated conditions, and establishing a new plan of care. Today, my ACP, Nila Barr NP was here to observe my evaluation and management services for this new problem/exacerbated condition to be followed going forward.\par \par  Vaccine status unknown

## 2024-05-29 NOTE — ED PROVIDER NOTE - CLINICAL SUMMARY MEDICAL DECISION MAKING FREE TEXT BOX
84-year-old male here for hypotension and bradycardia prior to arrival.  Currently is well-appearing but dehydrated.  Also consideration for sepsis, ACS, medication SE among other causes. Plan to perform IV hydration, check labs, EKG urinalysis and reassess.

## 2024-05-29 NOTE — ED PROVIDER NOTE - CONSTITUTIONAL NEGATIVE STATEMENT, MLM
epid cath placed at 1606  Test dose at 1607  epid pump started at 1610  Pt tolerated procedure well. no fever and no chills.

## 2024-05-29 NOTE — ED ADULT NURSE NOTE - NSFALLRISK_ED_ALL_ED
No.  I explained I would only give 30 days worth. I am not a psychiatrist.  He needs to find a psychiatrist to get these medications. The last psychiatrist refused to see him any longer as he missed his appointments. Do not make follow-up appointments with me for medication refills. I would not be refilling his psychiatric medications. Yes

## 2024-05-29 NOTE — ED PROVIDER NOTE - NSFOLLOWUPINSTRUCTIONS_ED_ALL_ED_FT
Dehydration    WHAT YOU NEED TO KNOW:    Dehydration is a condition that develops when your body does not have enough fluid. You may become dehydrated if you do not drink enough water or lose too much fluid. Fluid loss may also cause loss of electrolytes (minerals), such as sodium.    DISCHARGE INSTRUCTIONS:    Call your local emergency number (911 in the [place]) if:    You have a seizure.    You are confused or cannot think clearly.    You are extremely sleepy, or another person cannot wake you.    You have trouble breathing.  Seek care immediately if:    You become dizzy or faint when you stand.    You are not able to urinate.    You have a fast or irregular heartbeat.    Your hands or feet are cold, or your face is pale.  Call your doctor if:    You have trouble drinking liquids because you are vomiting.    You have episodes of vomiting for longer than 24 hours.    You have episodes of diarrhea for longer than 2 days.    Your symptoms get worse.    You have a fever.    You feel very weak or tired.    You have questions or concerns about your condition or care.  Prevent or manage dehydration:    Drink liquids as directed. Liquids that contain water, sugar, and minerals can help your body hold in fluid and help prevent dehydration. Drink liquids throughout the day, not just when you feel thirsty. Men should drink about 2.5 liters (11 eight-ounce cups) of liquid each day. Women should drink about 2 liters (9 eight-ounce cups) of liquid each day. Drink even more liquid if you will be outdoors, in the sun for a long time, or exercising.    Stay cool. Limit the time you spend outdoors during the hottest part of the day. Dress in lightweight clothes.    Keep track of how often you urinate. If you urinate less than usual or your urine is darker, drink more liquids.  Follow up with your doctor as directed: Write down your questions so you remember to ask them during your visits.    © [employer] 1973, 2024 Pneumonia    WHAT YOU NEED TO KNOW:    Pneumonia is an infection in your lungs caused by bacteria, viruses, fungi, or parasites. You can become infected if you come in contact with someone who is sick. You can get pneumonia if you recently had surgery or needed a ventilator to help you breathe. Pneumonia can also be caused by accidentally inhaling saliva or small pieces of food. Pneumonia may cause mild symptoms, or it can be severe and life-threatening.  The Lungs    DISCHARGE INSTRUCTIONS:    Seek care immediately if:    You cough up blood.    Your heart beats more than 100 beats in 1 minute.    You are very tired, confused, and cannot think clearly.    You have chest pain or trouble breathing.    Your lips or fingernails turn gray or blue.  Call your doctor if:    Your symptoms are the same or get worse 48 hours after you start antibiotics.    Your fever is not below 99°F (37.2°C) 48 hours after you start antibiotics.    You have a fever higher than 101°F (38.3°C).    You cannot eat, or you have loss of appetite, nausea, or are vomiting.    You have questions or concerns about your condition or care.  Medicines: You may need any of the following:    Antibiotics treat pneumonia caused by bacteria.    Acetaminophen decreases pain and fever. It is available without a doctor's order. Ask how much to take and how often to take it. Follow directions. Read the labels of all other medicines you are using to see if they also contain acetaminophen, or ask your doctor or pharmacist. Acetaminophen can cause liver damage if not taken correctly.    NSAIDs, such as ibuprofen, help decrease swelling, pain, and fever. This medicine is available with or without a doctor's order. NSAIDs can cause stomach bleeding or kidney problems in certain people. If you take blood thinner medicine, always ask your healthcare provider if NSAIDs are safe for you. Always read the medicine label and follow directions.    Take your medicine as directed. Contact your healthcare provider if you think your medicine is not helping or if you have side effects. Tell your provider if you are allergic to any medicine. Keep a list of the medicines, vitamins, and herbs you take. Include the amounts, and when and why you take them. Bring the list or the pill bottles to follow-up visits. Carry your medicine list with you in case of an emergency.  Manage your symptoms:    Rest as needed. Rest often throughout the day. Alternate times of activity with times of rest.    Drink liquids as directed. Ask how much liquid to drink each day and which liquids are best for you. Liquids help thin your mucus, which may make it easier for you to cough it up.    Do not smoke. Smoking increases your risk for pneumonia. Smoking also makes it harder for you to get better after you have had pneumonia. Ask your healthcare provider for information if you need help to quit smoking. Avoid secondhand smoke.    Limit alcohol. Women should limit alcohol to 1 drink a day. Men should limit alcohol to 2 drinks a day. A drink of alcohol is 12 ounces of beer, 5 ounces of wine, or 1½ ounces of liquor.    Use a cool mist humidifier. A humidifier will help increase air moisture in your home. This may make it easier for you to breathe and help decrease your cough.    Keep your head elevated. You may be able to breathe better if you keep your head and upper body elevated.  Elevate Head (Adult)  Prevent pneumonia:    Wash your hands often. Use soap and water. Wash for at least 20 seconds. Rinse with warm, running water for several seconds. Then dry your hands with a clean towel or paper towel. Use hand  that contains alcohol if soap and water are not available. Do not touch your eyes, nose, or mouth without washing your hands first.  Handwashing      Cover a sneeze or cough. Use a tissue that covers your mouth and nose. Throw the tissue away in a trash can right away. Use the bend of your arm if a tissue is not available. Wash your hands well with soap and water or use a hand . Do not stand close to anyone who is sneezing or coughing.    Stay away from others until you are well. Do not go to work or other activities. Wait until your symptoms are gone or your healthcare provider says it is okay to return.    Ask about vaccines you may need. A pneumonia vaccine can help lower your risk for pneumonia. The vaccine may be recommended every 5 years, starting at age 65. Other vaccines help lower the risk for infections that can become serious for a person who has pneumonia. Get a flu vaccine each year as soon as recommended, usually in September or October. Get a COVID-19 vaccine and booster as directed. Your healthcare provider can tell you if you should also get other vaccines, and when to get them.    Follow up with your doctor as directed: You will need to return for more tests. Write down your questions so you remember to ask them during your visits.        Dehydration    WHAT YOU NEED TO KNOW:    Dehydration is a condition that develops when your body does not have enough fluid. You may become dehydrated if you do not drink enough water or lose too much fluid. Fluid loss may also cause loss of electrolytes (minerals), such as sodium.    DISCHARGE INSTRUCTIONS:    Call your local emergency number (911 in the [place]) if:    You have a seizure.    You are confused or cannot think clearly.    You are extremely sleepy, or another person cannot wake you.    You have trouble breathing.  Seek care immediately if:    You become dizzy or faint when you stand.    You are not able to urinate.    You have a fast or irregular heartbeat.    Your hands or feet are cold, or your face is pale.  Call your doctor if:    You have trouble drinking liquids because you are vomiting.    You have episodes of vomiting for longer than 24 hours.    You have episodes of diarrhea for longer than 2 days.    Your symptoms get worse.    You have a fever.    You feel very weak or tired.    You have questions or concerns about your condition or care.  Prevent or manage dehydration:    Drink liquids as directed. Liquids that contain water, sugar, and minerals can help your body hold in fluid and help prevent dehydration. Drink liquids throughout the day, not just when you feel thirsty. Men should drink about 2.5 liters (11 eight-ounce cups) of liquid each day. Women should drink about 2 liters (9 eight-ounce cups) of liquid each day. Drink even more liquid if you will be outdoors, in the sun for a long time, or exercising.    Stay cool. Limit the time you spend outdoors during the hottest part of the day. Dress in lightweight clothes.    Keep track of how often you urinate. If you urinate less than usual or your urine is darker, drink more liquids.  Follow up with your doctor as directed: Write down your questions so you remember to ask them during your visits.    © [employer] 1973, 2024

## 2024-05-29 NOTE — ED PROVIDER NOTE - PATIENT PORTAL LINK FT
You can access the FollowMyHealth Patient Portal offered by [facility] by registering at the following website: [email or web address]. By joining [employer]’s FollowMyHealth portal, you will also be able to view your health information using other applications (apps) compatible with our system.

## 2024-05-29 NOTE — ED PROVIDER NOTE - PROGRESS NOTE DETAILS
Spoke again with patient daughter Ngoc.  /56 heart rate 68 saturation 99% bedside.  Patient has no acute complaints.  Plan to send urinalysis. Spoke with patient daughter Ngoc and discussed results showing pneumonia.  She spoke with patient and was unable to convince him to be admitted.  Risk/benefits/alternatives explained.  Plan to provide p.o. antibiotics to take at home.  Daughter states that he is able to take p.o. antibiotics after they are crushed.  Discussed signs and symptoms to return sooner to ED.  Understands risk of worsening pneumonia and leading to respiratory failure, sepsis, disability or even death as a result. Patient demonstrates capacity to make his medical decisions. Spoke with patient daughter Ngoc and discussed results showing pneumonia.  She spoke with patient and was unable to convince him to be admitted.  Risk/benefits/alternatives explained.  Plan to provide p.o. antibiotics to take at home.  Daughter states that he is able to take p.o. antibiotics after they are crushed.  Discussed signs and symptoms to return sooner to ED.  Understands risk of worsening pneumonia and leading to respiratory failure, sepsis, disability or even death as a result. Patient demonstrates capacity to make his medical decisions. Patient understand he is signing out AGAINST MEDICAL ADVICE.  [employer]  [place]ian Used to facilitate encounter. Spoke with patient daughter Ngoc and discussed results showing pneumonia.  She spoke with patient and was unable to convince him to be admitted.  Risk/benefits/alternatives explained.  Plan to provide p.o. antibiotics to take at home.  Daughter states that he is able to take p.o. antibiotics after they are crushed.  Discussed signs and symptoms to return sooner to ED.  Understands risk of worsening pneumonia and leading to respiratory failure, sepsis, disability or even death as a result. Patient demonstrates capacity to make his medical decisions. Patient and daughter both understand he is signing out AGAINST MEDICAL ADVICE.  [employer]  H[place]ian [id number] used to facilitate encounter.

## 2024-05-29 NOTE — ED PROVIDER NOTE - OBJECTIVE STATEMENT
84-year-old male history of HTN, HLD, dysphagia (refuses PEG) presenting with episode of generalized weakness at home.  EMS arrived and found patient hypotensive and bradycardic despite p.o. fluids..  Patient was reluctant to go to the emergency department but was forced by family.  Patient daughter Ngoc states that he often gets dehydrated due to lack of p.o. intake.  Patient also has a history of aspiration.  She reports that patient had no recent vomiting, diarrhea, fever or shortness of breath.  Patient refused free Filipino interpretation in favor of treatment interpretation by daughter.

## 2024-05-29 NOTE — ED ADULT NURSE NOTE - NSFALLHARMRISKINTERV_ED_ALL_ED

## 2024-05-29 NOTE — ED ADULT NURSE NOTE - NS ED NURSE LEVEL OF CONSCIOUSNESS MENTAL STATUS
Awake/Alert/Cooperative
Render Post-Care Instructions In Note?: yes
Number Of Freeze-Thaw Cycles: 1 freeze-thaw cycle
Post-Care Instructions: I reviewed with the patient in detail post-care instructions. Patient is to wear sunprotection, and avoid picking at any of the treated lesions. Pt may apply Vaseline to crusted or scabbing areas.
Duration Of Freeze Thaw-Cycle (Seconds): 1
Render Note In Bullet Format When Appropriate: No
Detail Level: Detailed
Consent: The patient's consent was obtained including but not limited to risks of crusting, scabbing, blistering, scarring, darker or lighter pigmentary change, recurrence, incomplete removal and infection.
Spray Paint Text: The liquid nitrogen was applied to the skin utilizing a spray paint frosting technique.
Medical Necessity Information: It is in your best interest to select a reason for this procedure from the list below. All of these items fulfill various CMS LCD requirements except the new and changing color options.
Number Of Freeze-Thaw Cycles: 3 freeze-thaw cycles
Duration Of Freeze Thaw-Cycle (Seconds): 3
Medical Necessity Clause: This procedure was medically necessary because the lesions that were treated were:

## 2024-06-03 LAB
CULTURE RESULTS: SIGNIFICANT CHANGE UP
CULTURE RESULTS: SIGNIFICANT CHANGE UP
SPECIMEN SOURCE: SIGNIFICANT CHANGE UP
SPECIMEN SOURCE: SIGNIFICANT CHANGE UP

## 2024-08-23 NOTE — HISTORY OF PRESENT ILLNESS
[FreeTextEntry1] : Mr. YENI SEGURA, 84 year old male, former smoker, w/ hx of TB exposure, COPD, GERD, COVID (12/2021 s/p monoclonal antibodies infusion), HTN, HLD, depression, gastric ulcer s/p partial gastrectomy, multiple recurrent aspiration requiring hospitalization, was recommended for PEG placement however, patient refused. Referred by Dr. Rodriguez for RUL nodule.  CT chest on 06/03/2022: - Part solid nodule with mild increase in density since prior, with soft tissue component, overall size 11 mm in right upper lobe, image 113 series 5, with solid component measuring approximately 3 mm. - Some stable small nodules such as a stable 9 mm nodule in the right lower lobe superior segment, image 160 series 5. - Focal areas of bronchial wall thickening particularly in the right upper lobe posteriorly. A new small nodule, 2 to 3 mm in the right upper lobe, image 63 series 5. New branching opacity in the lingula, image 191 series 5 probably mucoid impaction and adjacent clustered nodules, as on image 172 series 5. New clustered nodules also present in the inferior posterior right upper lobe, image 171 series 5, right middle lobe inferiorly, image 309 series 5; left lower lobe, image 262 series 5, and other locations.  CT Chest on 3/23/23: - Moderate upper lobe predominant emphysema - Moderate biapical pleural-parenchymal scarring; Scattered calcified granulomas. - Scattered solid and nonsolid nodules, annotations on series 5: * Stable 1 cm part solid nodule in RUL with stable 3 mm solid component (series 5 image 91) increased in size and density from more remote prior studies. * Stable 9 mm solid nodule in RLL (series 5 image 136) not significantly changed since at least 2015. * Stable 7 mm RUL solid nodule (series 5 image 130) * Scattered additional small solid nodules measuring up to 4 mm in size are stable and annotated on series 5. - Clustered tree-in-bud nodularity in the RML, slightly increased in extent from prior (series 5 image 241-266) - Clustered tree-in-bud nodularity previously seen in the MADI has decreased now with minimal residual endobronchial mucoid impaction (series 5 image 158) - Slightly decreased clustered tree-in-bud nodularity in the RUL (series 5 image 151) - Few subcentimeter calcified mediastinal and right hilar lymph nodes, likely sequela of prior granulomatous disease. - Punctate hepatic calcified granulomas.  PFTs on 04/24/2023: FVC 2.32 (75%), FEV1 2.06 (97%), DLCO 158%.  CT Chest on 10/17/23 at [facility]: - moderate upper lobe predominant emphysema - a new 1cm RLL ggo (5: 233) w/ adjacent small clustered nodules (5: 246) - scattered solid and nonsolid nodules: stable 1cm part solid RUL w/ a stable 3mm solid component (5: 103) but increased in size and density from more remote prior studies; stable 8mm solid RLL (5: 146); stable 7mm solid RUL (5: 141) - scattered additional solid nodules measuring up to 4mm  Seen in November, 2023: Admits to chronic, severe dysphagia, followed by GI, and refused PEG placement.   CT Chest on 2/14/2024: - RUL part solid nodule again measures 10mm w/ 3 mm solid component (Image 127) - RUL subpleural 4 mm nodule, stable (Image 122) - RLL solid nodule, 8mm (Image 171) - Areas of superimposed tree-in-bud nodularity/groundglass are stable/slightly improved with persistent areas of mucoid impaction and bronchiectasis specifically in the RML.  - Punctate nonobstructive left renal calculus measuring 2mm  Evaluated on 2/27/24: CT Chest reviewed with patient and his daughter, stable lung findings. I discussed repeating CT Chest without contrast in 6 months for re-evaluation. 2. Additionally, patient has severe dysphagia, discussed feeding tube placement for nutritional needs. He will think about his options and contact office with decision. If he decides to proceed, will schedule for redo laparotomy, feeding jejunostomy.  CT Chest on 8/19/24 ([facility]) - Mixed groundglass and solid nodule in RUL, approximately 1.1 x 0.7 cm (series 4, image 1 1) not significantly changed in size or density compared to 2/14/2024. Compared to 3/22/2021, size is not significantly changed but the lesion has increased in density and now contains solid components. - RLL nodule , 0.8 x 0.8 cm (series 4, image 139) is stable dating back to 3/22/2021. - RUL subpleural nodule,  0.4 cm, stable (series 4, image 93). - MADI nodule, 0.5 x 0.4 cm, stable (series 4, image 136). - Again seen are areas of mild groundglass opacity and tree-in-bud nodularity which are not significantly changed.  - Few scattered granulomas.  - Subcentimeter calcified subcarinal and right hilar lymph nodes.     Patient presents today or follow up.  Statement Selected

## 2024-08-27 ENCOUNTER — APPOINTMENT (OUTPATIENT)
Dept: THORACIC SURGERY | Facility: CLINIC | Age: 84
End: 2024-08-27

## 2024-09-10 ENCOUNTER — APPOINTMENT (OUTPATIENT)
Dept: THORACIC SURGERY | Facility: CLINIC | Age: 84
End: 2024-09-10
Payer: MEDICARE

## 2024-09-10 VITALS
DIASTOLIC BLOOD PRESSURE: 65 MMHG | SYSTOLIC BLOOD PRESSURE: 129 MMHG | HEART RATE: 60 BPM | WEIGHT: 95 LBS | HEIGHT: 65 IN | RESPIRATION RATE: 18 BRPM | BODY MASS INDEX: 15.83 KG/M2 | OXYGEN SATURATION: 100 %

## 2024-09-10 DIAGNOSIS — R91.8 OTHER NONSPECIFIC ABNORMAL FINDING OF LUNG FIELD: ICD-10-CM

## 2024-09-10 DIAGNOSIS — R13.13 DYSPHAGIA, PHARYNGEAL PHASE: ICD-10-CM

## 2024-09-10 PROCEDURE — 99213 OFFICE O/P EST LOW 20 MIN: CPT

## 2024-09-10 NOTE — HISTORY OF PRESENT ILLNESS
[FreeTextEntry1] : Mr. YENI SEGURA, 84 year old male, former smoker, w/ hx of TB exposure, COPD, GERD, COVID (12/2021 s/p monoclonal antibodies infusion), HTN, HLD, depression, gastric ulcer s/p partial gastrectomy, multiple recurrent aspiration requiring hospitalization, was recommended for PEG placement however, patient refused. Referred by Dr. Rodriguez for RUL nodule.  CT chest on 06/03/2022: - Part solid nodule with mild increase in density since prior, with soft tissue component, overall size 11 mm in right upper lobe, image 113 series 5, with solid component measuring approximately 3 mm. - Some stable small nodules such as a stable 9 mm nodule in the right lower lobe superior segment, image 160 series 5. - Focal areas of bronchial wall thickening particularly in the right upper lobe posteriorly. A new small nodule, 2 to 3 mm in the right upper lobe, image 63 series 5. New branching opacity in the lingula, image 191 series 5 probably mucoid impaction and adjacent clustered nodules, as on image 172 series 5. New clustered nodules also present in the inferior posterior right upper lobe, image 171 series 5, right middle lobe inferiorly, image 309 series 5; left lower lobe, image 262 series 5, and other locations.  CT Chest on 3/23/23: - Moderate upper lobe predominant emphysema - Moderate biapical pleural-parenchymal scarring; Scattered calcified granulomas. - Scattered solid and nonsolid nodules, annotations on series 5: * Stable 1 cm part solid nodule in RUL with stable 3 mm solid component (series 5 image 91) increased in size and density from more remote prior studies. * Stable 9 mm solid nodule in RLL (series 5 image 136) not significantly changed since at least 2015. * Stable 7 mm RUL solid nodule (series 5 image 130) * Scattered additional small solid nodules measuring up to 4 mm in size are stable and annotated on series 5. - Clustered tree-in-bud nodularity in the RML, slightly increased in extent from prior (series 5 image 241-266) - Clustered tree-in-bud nodularity previously seen in the MADI has decreased now with minimal residual endobronchial mucoid impaction (series 5 image 158) - Slightly decreased clustered tree-in-bud nodularity in the RUL (series 5 image 151) - Few subcentimeter calcified mediastinal and right hilar lymph nodes, likely sequela of prior granulomatous disease. - Punctate hepatic calcified granulomas.  PFTs on 04/24/2023: FVC 2.32 (75%), FEV1 2.06 (97%), DLCO 158%.  CT Chest on 10/17/23 at [facility]: - moderate upper lobe predominant emphysema - a new 1cm RLL ggo (5: 233) w/ adjacent small clustered nodules (5: 246) - scattered solid and nonsolid nodules: stable 1cm part solid RUL w/ a stable 3mm solid component (5: 103) but increased in size and density from more remote prior studies; stable 8mm solid RLL (5: 146); stable 7mm solid RUL (5: 141) - scattered additional solid nodules measuring up to 4mm  Seen in November, 2023: Admits to chronic, severe dysphagia, followed by GI, and refused PEG placement.   CT Chest on 2/14/2024: - RUL part solid nodule again measures 10mm w/ 3 mm solid component (Image 127) - RUL subpleural 4 mm nodule, stable (Image 122) - RLL solid nodule, 8mm (Image 171) - Areas of superimposed tree-in-bud nodularity/groundglass are stable/slightly improved with persistent areas of mucoid impaction and bronchiectasis specifically in the RML.  - Punctate nonobstructive left renal calculus measuring 2mm  Evaluated on 2/27/24: CT Chest reviewed with patient and his daughter, stable lung findings. I discussed repeating CT Chest without contrast in 6 months for re-evaluation. 2. Additionally, patient has severe dysphagia, discussed feeding tube placement for nutritional needs. He will think about his options and contact office with decision. If he decides to proceed, will schedule for redo laparotomy, feeding jejunostomy.  CT Chest on 8/19/24 ([facility]) - Mixed groundglass and solid nodule in RUL, approximately 1.1 x 0.7 cm (series 4, image 1 1) not significantly changed in size or density compared to 2/14/2024. Compared to 3/22/2021, size is not significantly changed but the lesion has increased in density and now contains solid components. - RLL nodule , 0.8 x 0.8 cm (series 4, image 139) is stable dating back to 3/22/2021. - RUL subpleural nodule,  0.4 cm, stable (series 4, image 93). - MADI nodule, 0.5 x 0.4 cm, stable (series 4, image 136). - Again seen are areas of mild groundglass opacity and tree-in-bud nodularity which are not significantly changed.  - Few scattered granulomas.  - Subcentimeter calcified subcarinal and right hilar lymph nodes.  Patient presents today or follow up.  On blended diet; Tolerating. Today, patient denies worsening SOB, chest pain, cough, hemoptysis, fever, chills, night sweats, lightheadedness or dizziness.

## 2024-09-10 NOTE — ASSESSMENT
[FreeTextEntry1] : Mr. YENI SEGURA, 84 year old male, former smoker, w/ hx of TB exposure, COPD, GERD, COVID (12/2021 s/p monoclonal antibodies infusion), HTN, HLD, depression, gastric ulcer s/p partial gastrectomy, multiple recurrent aspiration requiring hospitalization, was recommended for PEG placement however, patient refused. Referred by Dr. Rodriguez for RUL nodule. Currently on active surveillance.   He presents today for follow up with imaging.  I have independently reviewed the medical records and imaging at the time of this office consultation, and discussed the following interpretations with the patient: -  CT imaging reviewed; Stable findings. Discussed repeating a non contrast CT Chest in 3 months to re-evaluate stability.  - Patient w/ consistent weight loss. Discussed PEG tube placement for nutritional support. Patient defers at this time. Discussed nutritional supplementation to maintain weight. Instructed to also follow up with PCP regarding Speech pathology referral.   Recommendations reviewed with patient during this office visit, and all questions answered; Patient instructed on the importance of follow up and verbalizes understanding.  I, Dr. KRISTI BOSWELL, personally performed the evaluation and management (E/M) services for this established patient. That E/M includes conducting the examination, assessing all new/exacerbated conditions, and establishing a new plan of care. Today, My ACP, Jaida Bowens, was here to observe my evaluation and management services for this patient to be followed going forward.

## 2024-09-10 NOTE — PHYSICAL EXAM
[] : no respiratory distress [Respiration, Rhythm And Depth] : normal respiratory rhythm and effort [Exaggerated Use Of Accessory Muscles For Inspiration] : no accessory muscle use [Auscultation Breath Sounds / Voice Sounds] : lungs were clear to auscultation bilaterally [Heart Rate And Rhythm] : heart rate was normal and rhythm regular [Examination Of The Chest] : the chest was normal in appearance [Chest Visual Inspection Thoracic Asymmetry] : no chest asymmetry [Diminished Respiratory Excursion] : normal chest expansion [2+] : left 2+ [Bowel Sounds] : normal bowel sounds [Abdomen Soft] : soft [Abdomen Tenderness] : non-tender [Cervical Lymph Nodes Enlarged Posterior Bilaterally] : posterior cervical [Cervical Lymph Nodes Enlarged Anterior Bilaterally] : anterior cervical [Supraclavicular Lymph Nodes Enlarged Bilaterally] : supraclavicular [Involuntary Movements] : no involuntary movements were seen [Skin Color & Pigmentation] : normal skin color and pigmentation [No Focal Deficits] : no focal deficits [Oriented To Time, Place, And Person] : oriented to person, place, and time

## 2024-09-10 NOTE — HISTORY OF PRESENT ILLNESS
[FreeTextEntry1] : Mr. YENI SEGURA, 84 year old male, former smoker, w/ hx of TB exposure, COPD, GERD, COVID (12/2021 s/p monoclonal antibodies infusion), HTN, HLD, depression, gastric ulcer s/p partial gastrectomy, multiple recurrent aspiration requiring hospitalization, was recommended for PEG placement however, patient refused. Referred by Dr. Rodriguez for RUL nodule.  CT chest on 06/03/2022: - Part solid nodule with mild increase in density since prior, with soft tissue component, overall size 11 mm in right upper lobe, image 113 series 5, with solid component measuring approximately 3 mm. - Some stable small nodules such as a stable 9 mm nodule in the right lower lobe superior segment, image 160 series 5. - Focal areas of bronchial wall thickening particularly in the right upper lobe posteriorly. A new small nodule, 2 to 3 mm in the right upper lobe, image 63 series 5. New branching opacity in the lingula, image 191 series 5 probably mucoid impaction and adjacent clustered nodules, as on image 172 series 5. New clustered nodules also present in the inferior posterior right upper lobe, image 171 series 5, right middle lobe inferiorly, image 309 series 5; left lower lobe, image 262 series 5, and other locations.  CT Chest on 3/23/23: - Moderate upper lobe predominant emphysema - Moderate biapical pleural-parenchymal scarring; Scattered calcified granulomas. - Scattered solid and nonsolid nodules, annotations on series 5: * Stable 1 cm part solid nodule in RUL with stable 3 mm solid component (series 5 image 91) increased in size and density from more remote prior studies. * Stable 9 mm solid nodule in RLL (series 5 image 136) not significantly changed since at least 2015. * Stable 7 mm RUL solid nodule (series 5 image 130) * Scattered additional small solid nodules measuring up to 4 mm in size are stable and annotated on series 5. - Clustered tree-in-bud nodularity in the RML, slightly increased in extent from prior (series 5 image 241-266) - Clustered tree-in-bud nodularity previously seen in the MADI has decreased now with minimal residual endobronchial mucoid impaction (series 5 image 158) - Slightly decreased clustered tree-in-bud nodularity in the RUL (series 5 image 151) - Few subcentimeter calcified mediastinal and right hilar lymph nodes, likely sequela of prior granulomatous disease. - Punctate hepatic calcified granulomas.  PFTs on 04/24/2023: FVC 2.32 (75%), FEV1 2.06 (97%), DLCO 158%.  CT Chest on 10/17/23 at [facility]: - moderate upper lobe predominant emphysema - a new 1cm RLL ggo (5: 233) w/ adjacent small clustered nodules (5: 246) - scattered solid and nonsolid nodules: stable 1cm part solid RUL w/ a stable 3mm solid component (5: 103) but increased in size and density from more remote prior studies; stable 8mm solid RLL (5: 146); stable 7mm solid RUL (5: 141) - scattered additional solid nodules measuring up to 4mm  Seen in November, 2023: Admits to chronic, severe dysphagia, followed by GI, and refused PEG placement.   CT Chest on 2/14/2024: - RUL part solid nodule again measures 10mm w/ 3 mm solid component (Image 127) - RUL subpleural 4 mm nodule, stable (Image 122) - RLL solid nodule, 8mm (Image 171) - Areas of superimposed tree-in-bud nodularity/groundglass are stable/slightly improved with persistent areas of mucoid impaction and bronchiectasis specifically in the RML.  - Punctate nonobstructive left renal calculus measuring 2mm  Evaluated on 2/27/24: CT Chest reviewed with patient and his daughter, stable lung findings. I discussed repeating CT Chest without contrast in 6 months for re-evaluation. 2. Additionally, patient has severe dysphagia, discussed feeding tube placement for nutritional needs. He will think about his options and contact office with decision. If he decides to proceed, will schedule for redo laparotomy, feeding jejunostomy.  CT Chest on 8/19/24 ([facility]) - Mixed groundglass and solid nodule in RUL, approximately 1.1 x 0.7 cm (series 4, image 1 1) not significantly changed in size or density compared to 2/14/2024. Compared to 3/22/2021, size is not significantly changed but the lesion has increased in density and now contains solid components. - RLL nodule , 0.8 x 0.8 cm (series 4, image 139) is stable dating back to 3/22/2021. - RUL subpleural nodule,  0.4 cm, stable (series 4, image 93). - MADI nodule, 0.5 x 0.4 cm, stable (series 4, image 136). - Again seen are areas of mild groundglass opacity and tree-in-bud nodularity which are not significantly changed.  - Few scattered granulomas.  - Subcentimeter calcified subcarinal and right hilar lymph nodes.  Patient presents today or follow up.  On blended diet; Tolerating. Today, patient denies worsening SOB, chest pain, cough, hemoptysis, fever, chills, night sweats, lightheadedness or dizziness.

## 2024-09-18 ENCOUNTER — OFFICE (OUTPATIENT)
Dept: URBAN - METROPOLITAN AREA CLINIC 90 | Facility: CLINIC | Age: 84
Setting detail: OPHTHALMOLOGY
End: 2024-09-18
Payer: MEDICARE

## 2024-09-18 DIAGNOSIS — H25.13: ICD-10-CM

## 2024-09-18 DIAGNOSIS — H11.043: ICD-10-CM

## 2024-09-18 DIAGNOSIS — H25.89: ICD-10-CM

## 2024-09-18 DIAGNOSIS — H40.1134: ICD-10-CM

## 2024-09-18 PROCEDURE — 92004 COMPRE OPH EXAM NEW PT 1/>: CPT | Performed by: OPHTHALMOLOGY

## 2024-09-18 ASSESSMENT — LID POSITION - PTOSIS
OS_PTOSIS: LUL 3+
OD_PTOSIS: RUL 1+ 2+

## 2024-09-18 ASSESSMENT — CONFRONTATIONAL VISUAL FIELD TEST (CVF)
OD_FINDINGS: FULL
OS_FINDINGS: FULL

## 2024-10-30 ENCOUNTER — OFFICE (OUTPATIENT)
Age: 84
Setting detail: OPHTHALMOLOGY
End: 2024-10-30
Payer: MEDICARE

## 2024-10-30 ENCOUNTER — RX ONLY (RX ONLY)
Age: 84
End: 2024-10-30

## 2024-10-30 DIAGNOSIS — H25.13: ICD-10-CM

## 2024-10-30 DIAGNOSIS — H40.1112: ICD-10-CM

## 2024-10-30 DIAGNOSIS — H11.043: ICD-10-CM

## 2024-10-30 DIAGNOSIS — H25.89: ICD-10-CM

## 2024-10-30 DIAGNOSIS — H40.1121: ICD-10-CM

## 2024-10-30 PROCEDURE — 92133 CPTRZD OPH DX IMG PST SGM ON: CPT | Performed by: OPHTHALMOLOGY

## 2024-10-30 PROCEDURE — 99213 OFFICE O/P EST LOW 20 MIN: CPT | Performed by: OPHTHALMOLOGY

## 2024-10-30 ASSESSMENT — REFRACTION_AUTOREFRACTION
OS_CYLINDER: -0.25
OS_AXIS: 076
OD_CYLINDER: -1.75
OS_SPHERE: -3.25
OD_SPHERE: -3.75
OD_AXIS: 106

## 2024-10-30 ASSESSMENT — TONOMETRY
OS_IOP_MMHG: 14
OD_IOP_MMHG: 14

## 2024-10-30 ASSESSMENT — CONFRONTATIONAL VISUAL FIELD TEST (CVF)
OS_FINDINGS: FULL
OD_FINDINGS: FULL

## 2024-10-30 ASSESSMENT — KERATOMETRY
OS_K2POWER_DIOPTERS: 44.50
OS_AXISANGLE_DEGREES: 104
OD_K2POWER_DIOPTERS: 44.50
OS_K1POWER_DIOPTERS: 43.25
OD_K1POWER_DIOPTERS: 43.50
OD_AXISANGLE_DEGREES: 067
METHOD_AUTO_MANUAL: AUTO

## 2024-10-30 ASSESSMENT — VISUAL ACUITY
OD_BCVA: 20/125
OS_BCVA: 20/200-1

## 2024-12-16 ENCOUNTER — OFFICE (OUTPATIENT)
Facility: LOCATION | Age: 84
Setting detail: OPHTHALMOLOGY
End: 2024-12-16
Payer: MEDICARE

## 2024-12-16 DIAGNOSIS — H02.403: ICD-10-CM

## 2024-12-16 PROBLEM — G71.031: Status: ACTIVE | Noted: 2024-12-16

## 2024-12-16 PROCEDURE — 92285 EXTERNAL OCULAR PHOTOGRAPHY: CPT | Performed by: OPHTHALMOLOGY

## 2024-12-16 PROCEDURE — 99213 OFFICE O/P EST LOW 20 MIN: CPT | Performed by: OPHTHALMOLOGY

## 2024-12-16 ASSESSMENT — REFRACTION_AUTOREFRACTION
OS_AXIS: 076
OS_SPHERE: -3.25
OD_CYLINDER: -1.75
OS_CYLINDER: -0.25
OD_SPHERE: -3.75
OD_AXIS: 106

## 2024-12-16 ASSESSMENT — KERATOMETRY
OD_K2POWER_DIOPTERS: 44.50
OS_K2POWER_DIOPTERS: 44.50
METHOD_AUTO_MANUAL: AUTO
OS_K1POWER_DIOPTERS: 43.25
OD_K1POWER_DIOPTERS: 43.50
OS_AXISANGLE_DEGREES: 104
OD_AXISANGLE_DEGREES: 067

## 2024-12-16 ASSESSMENT — VISUAL ACUITY
OS_BCVA: 20/200
OD_BCVA: 20/50-2

## 2024-12-16 ASSESSMENT — LID POSITION - PTOSIS
OS_PTOSIS: LUL 3+
OD_PTOSIS: RUL 2+

## 2024-12-16 ASSESSMENT — CONFRONTATIONAL VISUAL FIELD TEST (CVF)
OD_FINDINGS: FULL
OS_FINDINGS: FULL

## 2024-12-24 PROBLEM — F10.90 ALCOHOL USE: Status: ACTIVE | Noted: 2020-12-10

## 2025-01-16 ENCOUNTER — OFFICE (OUTPATIENT)
Facility: LOCATION | Age: 85
Setting detail: OPHTHALMOLOGY
End: 2025-01-16
Payer: MEDICARE

## 2025-01-16 DIAGNOSIS — H25.89: ICD-10-CM

## 2025-01-16 DIAGNOSIS — H16.221: ICD-10-CM

## 2025-01-16 DIAGNOSIS — H40.1121: ICD-10-CM

## 2025-01-16 DIAGNOSIS — H25.13: ICD-10-CM

## 2025-01-16 DIAGNOSIS — H01.004: ICD-10-CM

## 2025-01-16 DIAGNOSIS — H01.001: ICD-10-CM

## 2025-01-16 DIAGNOSIS — H40.1112: ICD-10-CM

## 2025-01-16 DIAGNOSIS — H02.403: ICD-10-CM

## 2025-01-16 PROCEDURE — 92012 INTRM OPH EXAM EST PATIENT: CPT | Performed by: OPHTHALMOLOGY

## 2025-01-16 ASSESSMENT — REFRACTION_CURRENTRX
OD_OVR_VA: 20/
OS_OVR_VA: 20/
OD_AXIS: 096
OD_SPHERE: -3.75
OS_AXIS: 074
OS_SPHERE: -2.50
OS_CYLINDER: -0.50
OD_CYLINDER: -0.50

## 2025-01-16 ASSESSMENT — VISUAL ACUITY
OD_BCVA: 20/60-
OS_BCVA: 20/300

## 2025-01-16 ASSESSMENT — LID EXAM ASSESSMENTS
OS_LEVATOR_FUNCTION: 2 MM
OS_MRD1: -1 MM
OD_BLEPHARITIS: RUL 2+
OS_BLEPHARITIS: LUL 2+
OD_LEVATOR_FUNCTION: 2 MM
OD_MRD1: 1 MM

## 2025-01-16 ASSESSMENT — TONOMETRY
OS_IOP_MMHG: 16
OD_IOP_MMHG: 17
OS_IOP_MMHG: 13
OD_IOP_MMHG: 13

## 2025-01-16 ASSESSMENT — CONFRONTATIONAL VISUAL FIELD TEST (CVF)
OS_FINDINGS: FULL
OD_FINDINGS: FULL

## 2025-01-16 ASSESSMENT — KERATOMETRY
METHOD_AUTO_MANUAL: AUTO
OS_K1POWER_DIOPTERS: 43.75
OS_K2POWER_DIOPTERS: 45.00
OD_K1POWER_DIOPTERS: 42.75
OD_K2POWER_DIOPTERS: 44.25
OS_AXISANGLE_DEGREES: 114
OD_AXISANGLE_DEGREES: 040

## 2025-01-16 ASSESSMENT — LID POSITION - PTOSIS
OS_PTOSIS: LUL 3+
OD_PTOSIS: RUL 2+

## 2025-01-16 ASSESSMENT — REFRACTION_AUTOREFRACTION
OD_SPHERE: -4.75
OD_CYLINDER: -0.75
OD_AXIS: 096
OS_SPHERE: -3.25
OS_CYLINDER: -1.00
OS_AXIS: 024

## 2025-01-16 ASSESSMENT — SUPERFICIAL PUNCTATE KERATITIS (SPK): OD_SPK: T

## 2025-01-28 ENCOUNTER — OFFICE (OUTPATIENT)
Facility: LOCATION | Age: 85
Setting detail: OPHTHALMOLOGY
End: 2025-01-28
Payer: MEDICARE

## 2025-01-28 ENCOUNTER — RX ONLY (RX ONLY)
Age: 85
End: 2025-01-28

## 2025-01-28 DIAGNOSIS — H40.1421: ICD-10-CM

## 2025-01-28 DIAGNOSIS — H25.89: ICD-10-CM

## 2025-01-28 DIAGNOSIS — H40.1413: ICD-10-CM

## 2025-01-28 DIAGNOSIS — H25.11: ICD-10-CM

## 2025-01-28 DIAGNOSIS — H25.13: ICD-10-CM

## 2025-01-28 PROBLEM — H01.004 BLEPHARITIS; RIGHT UPPER LID, LEFT UPPER LID: Status: ACTIVE | Noted: 2025-01-16

## 2025-01-28 PROBLEM — H25.12 CATARACT SENILE NUCLEAR SCLEROSIS; RIGHT EYE, LEFT EYE, BOTH EYES: Status: ACTIVE | Noted: 2025-01-28

## 2025-01-28 PROBLEM — H16.221 DRY EYE SYNDROME K SICCA; RIGHT EYE: Status: ACTIVE | Noted: 2025-01-16

## 2025-01-28 PROBLEM — H01.001 BLEPHARITIS; RIGHT UPPER LID, LEFT UPPER LID: Status: ACTIVE | Noted: 2025-01-16

## 2025-01-28 PROCEDURE — 92136 OPHTHALMIC BIOMETRY: CPT | Mod: TC | Performed by: OPHTHALMOLOGY

## 2025-01-28 PROCEDURE — 92133 CPTRZD OPH DX IMG PST SGM ON: CPT | Performed by: OPHTHALMOLOGY

## 2025-01-28 PROCEDURE — 92083 EXTENDED VISUAL FIELD XM: CPT | Performed by: OPHTHALMOLOGY

## 2025-01-28 PROCEDURE — 92136 OPHTHALMIC BIOMETRY: CPT | Mod: 26,RT | Performed by: OPHTHALMOLOGY

## 2025-01-28 PROCEDURE — 99214 OFFICE O/P EST MOD 30 MIN: CPT | Performed by: OPHTHALMOLOGY

## 2025-01-28 PROCEDURE — 76514 ECHO EXAM OF EYE THICKNESS: CPT | Performed by: OPHTHALMOLOGY

## 2025-01-28 ASSESSMENT — REFRACTION_CURRENTRX
OS_OVR_VA: 20/
OD_OVR_VA: 20/
OD_AXIS: 98
OD_CYLINDER: -0.50
OD_SPHERE: -3.75
OS_SPHERE: -2.50
OS_CYLINDER: -0.50
OS_CYLINDER: -0.50
OS_AXIS: 079
OD_AXIS: 099
OD_SPHERE: -3.75
OS_OVR_VA: 20/
OD_CYLINDER: -0.50
OD_OVR_VA: 20/
OS_SPHERE: -2.50
OS_AXIS: 071

## 2025-01-28 ASSESSMENT — PACHYMETRY
OD_CT_CORRECTION: 4
OS_CT_UM: 517
OS_CT_CORRECTION: 2
OD_CT_UM: 491

## 2025-01-28 ASSESSMENT — REFRACTION_AUTOREFRACTION
OD_AXIS: 085
OD_CYLINDER: -1.50
OS_CYLINDER: -0.25
OD_SPHERE: -2.75
OS_SPHERE: -3.50
OS_AXIS: 035

## 2025-01-28 ASSESSMENT — CONFRONTATIONAL VISUAL FIELD TEST (CVF)
OD_FINDINGS: FULL
OS_FINDINGS: FULL

## 2025-01-28 ASSESSMENT — KERATOMETRY
OS_K1POWER_DIOPTERS: 43.50
OD_K2POWER_DIOPTERS: 43.50
OD_K1POWER_DIOPTERS: 44.25
METHOD_AUTO_MANUAL: AUTO
OD_AXISANGLE_DEGREES: 090
OD_K2POWER_DIOPTERS: 43.50
OS_AXISANGLE_DEGREES: 105
OS_K1K2_AVERAGE: 43.75
OD_CYLPOWER_DEGREES: 0.75
OD_CYLAXISANGLE_DEGREES: 90
OD_K1K2_AVERAGE: 43.875
OS_K1POWER_DIOPTERS: 43.50
OS_K2POWER_DIOPTERS: 44.00
OS_K2POWER_DIOPTERS: 44.00
OS_CYLAXISANGLE_DEGREES: 105
OD_AXISANGLE_DEGREES: 090
OD_AXISANGLE2_DEGREES: 090
OS_AXISANGLE2_DEGREES: 105
OD_K1POWER_DIOPTERS: 44.25
OS_CYLPOWER_DEGREES: 0.5
OS_AXISANGLE_DEGREES: 105

## 2025-01-28 ASSESSMENT — LID EXAM ASSESSMENTS
OS_LEVATOR_FUNCTION: 2 MM
OD_MRD1: 1 MM
OS_MRD1: -1 MM
OD_BLEPHARITIS: RUL 2+
OS_BLEPHARITIS: LUL 2+
OD_LEVATOR_FUNCTION: 2 MM

## 2025-01-28 ASSESSMENT — LID POSITION - PTOSIS
OD_PTOSIS: RUL 2+
OS_PTOSIS: LUL 3+

## 2025-01-28 ASSESSMENT — VISUAL ACUITY
OS_BCVA: 20/300
OD_BCVA: 20/50-2

## 2025-01-28 ASSESSMENT — TONOMETRY
OS_IOP_MMHG: 17
OD_IOP_MMHG: 17

## 2025-01-28 ASSESSMENT — SUPERFICIAL PUNCTATE KERATITIS (SPK): OD_SPK: T

## 2025-03-11 ENCOUNTER — APPOINTMENT (OUTPATIENT)
Dept: THORACIC SURGERY | Facility: CLINIC | Age: 85
End: 2025-03-11
Payer: MEDICARE

## 2025-03-11 VITALS
BODY MASS INDEX: 15.42 KG/M2 | OXYGEN SATURATION: 96 % | HEIGHT: 65.75 IN | SYSTOLIC BLOOD PRESSURE: 173 MMHG | HEART RATE: 70 BPM | RESPIRATION RATE: 16 BRPM | DIASTOLIC BLOOD PRESSURE: 76 MMHG | WEIGHT: 94.8 LBS

## 2025-03-11 DIAGNOSIS — R91.8 OTHER NONSPECIFIC ABNORMAL FINDING OF LUNG FIELD: ICD-10-CM

## 2025-03-11 DIAGNOSIS — R13.13 DYSPHAGIA, PHARYNGEAL PHASE: ICD-10-CM

## 2025-03-11 PROCEDURE — 99213 OFFICE O/P EST LOW 20 MIN: CPT

## 2025-06-17 ENCOUNTER — APPOINTMENT (OUTPATIENT)
Dept: THORACIC SURGERY | Facility: CLINIC | Age: 85
End: 2025-06-17
Payer: MEDICARE

## 2025-06-17 VITALS
BODY MASS INDEX: 16.66 KG/M2 | RESPIRATION RATE: 16 BRPM | OXYGEN SATURATION: 97 % | DIASTOLIC BLOOD PRESSURE: 66 MMHG | WEIGHT: 100 LBS | HEART RATE: 57 BPM | HEIGHT: 65 IN | SYSTOLIC BLOOD PRESSURE: 160 MMHG

## 2025-06-17 PROCEDURE — 99215 OFFICE O/P EST HI 40 MIN: CPT

## 2025-07-19 ENCOUNTER — APPOINTMENT (OUTPATIENT)
Dept: NUCLEAR MEDICINE | Facility: IMAGING CENTER | Age: 85
End: 2025-07-19
Payer: MEDICARE

## 2025-07-19 ENCOUNTER — OUTPATIENT (OUTPATIENT)
Dept: OUTPATIENT SERVICES | Facility: HOSPITAL | Age: 85
LOS: 1 days | End: 2025-07-19
Payer: MEDICARE

## 2025-07-19 DIAGNOSIS — R91.8 OTHER NONSPECIFIC ABNORMAL FINDING OF LUNG FIELD: ICD-10-CM

## 2025-07-19 PROCEDURE — A9552: CPT

## 2025-07-19 PROCEDURE — 78815 PET IMAGE W/CT SKULL-THIGH: CPT | Mod: 26,PI

## 2025-07-19 PROCEDURE — 78815 PET IMAGE W/CT SKULL-THIGH: CPT

## 2025-07-21 ENCOUNTER — NON-APPOINTMENT (OUTPATIENT)
Age: 85
End: 2025-07-21

## 2025-08-06 ENCOUNTER — APPOINTMENT (OUTPATIENT)
Dept: THORACIC SURGERY | Facility: HOSPITAL | Age: 85
End: 2025-08-06